# Patient Record
Sex: FEMALE | Race: BLACK OR AFRICAN AMERICAN | URBAN - METROPOLITAN AREA
[De-identification: names, ages, dates, MRNs, and addresses within clinical notes are randomized per-mention and may not be internally consistent; named-entity substitution may affect disease eponyms.]

---

## 2021-05-12 ENCOUNTER — OFFICE VISIT (OUTPATIENT)
Dept: INTERNAL MEDICINE CLINIC | Age: 26
End: 2021-05-12
Payer: COMMERCIAL

## 2021-05-12 VITALS
SYSTOLIC BLOOD PRESSURE: 124 MMHG | DIASTOLIC BLOOD PRESSURE: 80 MMHG | TEMPERATURE: 97.5 F | RESPIRATION RATE: 16 BRPM | HEART RATE: 85 BPM | WEIGHT: 145 LBS | OXYGEN SATURATION: 98 % | BODY MASS INDEX: 25.69 KG/M2 | HEIGHT: 63 IN

## 2021-05-12 DIAGNOSIS — Z76.89 ENCOUNTER TO ESTABLISH CARE: ICD-10-CM

## 2021-05-12 DIAGNOSIS — J02.9 SORE THROAT: Primary | ICD-10-CM

## 2021-05-12 DIAGNOSIS — J45.20 MILD INTERMITTENT ASTHMA WITHOUT COMPLICATION: ICD-10-CM

## 2021-05-12 LAB
S PYO AG THROAT QL: NEGATIVE
VALID INTERNAL CONTROL?: YES

## 2021-05-12 PROCEDURE — 99203 OFFICE O/P NEW LOW 30 MIN: CPT | Performed by: NURSE PRACTITIONER

## 2021-05-12 PROCEDURE — 87880 STREP A ASSAY W/OPTIC: CPT | Performed by: NURSE PRACTITIONER

## 2021-05-12 RX ORDER — ALBUTEROL SULFATE 90 UG/1
1-2 AEROSOL, METERED RESPIRATORY (INHALATION)
Qty: 1 INHALER | Refills: 1 | Status: SHIPPED | OUTPATIENT
Start: 2021-05-12

## 2021-05-12 RX ORDER — LORATADINE 10 MG/1
10 TABLET ORAL
Qty: 30 TAB | Refills: 3 | Status: SHIPPED | OUTPATIENT
Start: 2021-05-12

## 2021-05-12 RX ORDER — MONTELUKAST SODIUM 10 MG/1
10 TABLET ORAL DAILY
Qty: 30 TAB | Refills: 0 | Status: CANCELLED | OUTPATIENT
Start: 2021-05-12

## 2021-05-12 NOTE — PROGRESS NOTES
HISTORY OF PRESENT ILLNESS  Jolene Richards is a 26 y.o. female. Here to establish care with complaints of sore throat. HPI  She reports her throat is hurting on and off - will go away for week and it will return, mostly on the left side of her throat - tea does help some. Denies problem swallowing, coughing, runny nose or watery eyes. She does not have a humidifier. use to use an asthma pump- Hx of asthma. She does have a GYN Dr she is up to date on PAP's. /80 (BP 1 Location: Right arm, BP Patient Position: Sitting)   Pulse 85   Temp 97.5 °F (36.4 °C) (Temporal)   Resp 16   Ht 5' 3\" (1.6 m)   Wt 145 lb (65.8 kg)   LMP 05/04/2021 (Approximate)   SpO2 98%   BMI 25.69 kg/m²     Review of Systems   Constitutional: Negative for chills, fever and malaise/fatigue. Eyes: Negative for blurred vision and double vision. Respiratory: Negative for cough, shortness of breath and wheezing. Cardiovascular: Negative for chest pain, palpitations and leg swelling. Neurological: Positive for headaches (with computer use ). Negative for dizziness. Physical Exam  Vitals signs and nursing note reviewed. Constitutional:       General: She is not in acute distress. Appearance: Normal appearance. She is not ill-appearing. HENT:      Head: Normocephalic. Right Ear: External ear normal.      Left Ear: External ear normal.      Nose: Nose normal.      Mouth/Throat:      Mouth: Mucous membranes are moist.      Pharynx: Oropharynx is clear. Posterior oropharyngeal erythema present. Comments: MASK  Eyes:      General: No scleral icterus. Extraocular Movements: Extraocular movements intact. Conjunctiva/sclera: Conjunctivae normal.      Pupils: Pupils are equal, round, and reactive to light. Neck:      Musculoskeletal: Normal range of motion. Cardiovascular:      Rate and Rhythm: Normal rate and regular rhythm. Pulses: Normal pulses. Heart sounds: Normal heart sounds. Pulmonary:      Effort: Pulmonary effort is normal. No respiratory distress. Breath sounds: Normal breath sounds. No wheezing. Abdominal:      Tenderness: There is no right CVA tenderness or left CVA tenderness. Musculoskeletal: Normal range of motion. Right lower leg: No edema. Left lower leg: No edema. Lymphadenopathy:      Cervical: No cervical adenopathy. Skin:     General: Skin is warm and dry. Findings: No lesion or rash. Neurological:      General: No focal deficit present. Mental Status: She is alert and oriented to person, place, and time. Psychiatric:         Mood and Affect: Mood normal.         Behavior: Behavior normal.         Thought Content: Thought content normal.         Judgment: Judgment normal.       ASSESSMENT and PLAN  Diagnoses and all orders for this visit:    1. Sore throat  -     AMB POC RAPID STREP A  -     loratadine (Claritin) 10 mg tablet; Take 1 Tab by mouth daily as needed for Allergies. 2. Mild intermittent asthma without complication  -     albuterol (PROVENTIL HFA, VENTOLIN HFA, PROAIR HFA) 90 mcg/actuation inhaler; Take 1-2 Puffs by inhalation every six (6) hours as needed for Wheezing or Shortness of Breath. 3. Encounter to establish care  -     CBC WITH AUTOMATED DIFF; Future  -     METABOLIC PANEL, COMPREHENSIVE; Future  -     TSH AND FREE T4; Future  -     URINALYSIS W/MICROSCOPIC; Future      Follow-up and Dispositions    · Return if symptoms worsen or fail to improve.

## 2021-05-12 NOTE — PROGRESS NOTES
ROOM # 1  Identified pt with two pt identifiers(name and ). Reviewed record in preparation for visit and have obtained necessary documentation. Chief Complaint   Patient presents with    Establish Care    Sore Throat     x 3 months       Wing Ennis preferred language for health care discussion is english/other. Is the patient using any DME equipment during OV? NO    Wing Ennis is due for:  Health Maintenance Due   Topic    HPV Age 9Y-34Y (1 - 2-dose series)    DTaP/Tdap/Td series (1 - Tdap)    PAP AKA CERVICAL CYTOLOGY      Health Maintenance reviewed and discussed per provider  Please order/place referral if appropriate. Advance Directive:  1. Do you have an advance directive in place? Patient Reply: NO    2. If not, would you like material regarding how to put one in place? NO    Coordination of Care:  1. Have you been to the ER, urgent care clinic since your last visit? Hospitalized since your last visit? NO    2. Have you seen or consulted any other health care providers outside of the [address] [place] [address] since your last visit? Include any pap smears or colon screening. NO    Patient is accompanied by self I have received verbal consent from Wing Ennis to discuss any/all medical information while they are present in the room. Depression Screening:  3 most recent PHQ Screens 2021   Little interest or pleasure in doing things Not at all   Feeling down, depressed, irritable, or hopeless Not at all   Total Score PHQ 2 0     Recent Travel Screening and Travel History documentation     Travel Screening     Question   Response    In the last month, have you been in contact with someone who was confirmed or suspected to have Coronavirus / COVID-19? No / Unsure    Have you had a COVID-19 viral test in the last 14 days? No    Do you have any of the following new or worsening symptoms? None of these    Have you traveled internationally or domestically in the last month?   No Travel History   Travel since 04/12/21     No documented travel since 04/12/21

## 2021-05-13 LAB
ALBUMIN SERPL-MCNC: 4.6 G/DL (ref 3.9–5)
ALBUMIN/GLOB SERPL: 1.5 {RATIO} (ref 1.2–2.2)
ALP SERPL-CCNC: 78 IU/L (ref 39–117)
ALT SERPL-CCNC: 10 IU/L (ref 0–32)
APPEARANCE UR: CLEAR
AST SERPL-CCNC: 12 IU/L (ref 0–40)
BACTERIA #/AREA URNS HPF: NORMAL /[HPF]
BASOPHILS # BLD AUTO: 0.1 X10E3/UL (ref 0–0.2)
BASOPHILS NFR BLD AUTO: 1 %
BILIRUB SERPL-MCNC: 0.4 MG/DL (ref 0–1.2)
BILIRUB UR QL STRIP: NEGATIVE
BUN SERPL-MCNC: 14 MG/DL (ref 6–20)
BUN/CREAT SERPL: 20 (ref 9–23)
CALCIUM SERPL-MCNC: 9.7 MG/DL (ref 8.7–10.2)
CASTS URNS QL MICRO: NORMAL /LPF
CHLORIDE SERPL-SCNC: 102 MMOL/L (ref 96–106)
CO2 SERPL-SCNC: 25 MMOL/L (ref 20–29)
COLOR UR: YELLOW
CREAT SERPL-MCNC: 0.69 MG/DL (ref 0.57–1)
EOSINOPHIL # BLD AUTO: 0.2 X10E3/UL (ref 0–0.4)
EOSINOPHIL NFR BLD AUTO: 3 %
EPI CELLS #/AREA URNS HPF: NORMAL /HPF (ref 0–10)
ERYTHROCYTE [DISTWIDTH] IN BLOOD BY AUTOMATED COUNT: 11.9 % (ref 11.7–15.4)
GLOBULIN SER CALC-MCNC: 3.1 G/DL (ref 1.5–4.5)
GLUCOSE SERPL-MCNC: 81 MG/DL (ref 65–99)
GLUCOSE UR QL: NEGATIVE
HCT VFR BLD AUTO: 39.2 % (ref 34–46.6)
HGB BLD-MCNC: 12.6 G/DL (ref 11.1–15.9)
HGB UR QL STRIP: NEGATIVE
IMM GRANULOCYTES # BLD AUTO: 0 X10E3/UL (ref 0–0.1)
IMM GRANULOCYTES NFR BLD AUTO: 0 %
KETONES UR QL STRIP: NEGATIVE
LEUKOCYTE ESTERASE UR QL STRIP: NEGATIVE
LYMPHOCYTES # BLD AUTO: 2.1 X10E3/UL (ref 0.7–3.1)
LYMPHOCYTES NFR BLD AUTO: 35 %
MCH RBC QN AUTO: 29 PG (ref 26.6–33)
MCHC RBC AUTO-ENTMCNC: 32.1 G/DL (ref 31.5–35.7)
MCV RBC AUTO: 90 FL (ref 79–97)
MICRO URNS: ABNORMAL
MICRO URNS: ABNORMAL
MONOCYTES # BLD AUTO: 0.4 X10E3/UL (ref 0.1–0.9)
MONOCYTES NFR BLD AUTO: 7 %
NEUTROPHILS # BLD AUTO: 3.1 X10E3/UL (ref 1.4–7)
NEUTROPHILS NFR BLD AUTO: 54 %
NITRITE UR QL STRIP: NEGATIVE
PH UR STRIP: 5.5 [PH] (ref 5–7.5)
PLATELET # BLD AUTO: 254 X10E3/UL (ref 150–450)
POTASSIUM SERPL-SCNC: 4.2 MMOL/L (ref 3.5–5.2)
PROT SERPL-MCNC: 7.7 G/DL (ref 6–8.5)
PROT UR QL STRIP: NEGATIVE
RBC # BLD AUTO: 4.35 X10E6/UL (ref 3.77–5.28)
RBC #/AREA URNS HPF: NORMAL /HPF (ref 0–2)
SODIUM SERPL-SCNC: 139 MMOL/L (ref 134–144)
SP GR UR: >=1.03 (ref 1–1.03)
T4 FREE SERPL-MCNC: 0.99 NG/DL (ref 0.82–1.77)
TSH SERPL DL<=0.005 MIU/L-ACNC: 1.35 UIU/ML (ref 0.45–4.5)
UROBILINOGEN UR STRIP-MCNC: 0.2 MG/DL (ref 0.2–1)
WBC # BLD AUTO: 5.8 X10E3/UL (ref 3.4–10.8)
WBC #/AREA URNS HPF: NORMAL /HPF (ref 0–5)

## 2024-01-03 SDOH — HEALTH STABILITY: PHYSICAL HEALTH: ON AVERAGE, HOW MANY MINUTES DO YOU ENGAGE IN EXERCISE AT THIS LEVEL?: 50 MIN

## 2024-01-03 SDOH — HEALTH STABILITY: PHYSICAL HEALTH: ON AVERAGE, HOW MANY DAYS PER WEEK DO YOU ENGAGE IN MODERATE TO STRENUOUS EXERCISE (LIKE A BRISK WALK)?: 3 DAYS

## 2024-01-04 ENCOUNTER — HOSPITAL ENCOUNTER (OUTPATIENT)
Facility: HOSPITAL | Age: 29
Setting detail: SPECIMEN
Discharge: HOME | End: 2024-01-04
Payer: COMMERCIAL

## 2024-01-04 ENCOUNTER — OFFICE VISIT (OUTPATIENT)
Facility: CLINIC | Age: 29
End: 2024-01-04
Payer: COMMERCIAL

## 2024-01-04 VITALS
WEIGHT: 164 LBS | OXYGEN SATURATION: 95 % | RESPIRATION RATE: 17 BRPM | TEMPERATURE: 97.9 F | SYSTOLIC BLOOD PRESSURE: 105 MMHG | HEART RATE: 85 BPM | HEIGHT: 63 IN | BODY MASS INDEX: 29.06 KG/M2 | DIASTOLIC BLOOD PRESSURE: 73 MMHG

## 2024-01-04 DIAGNOSIS — J45.20 MILD INTERMITTENT ASTHMA WITHOUT COMPLICATION: ICD-10-CM

## 2024-01-04 DIAGNOSIS — Z13.220 SCREENING CHOLESTEROL LEVEL: ICD-10-CM

## 2024-01-04 DIAGNOSIS — Z76.89 ENCOUNTER TO ESTABLISH CARE: ICD-10-CM

## 2024-01-04 DIAGNOSIS — Z76.89 ENCOUNTER TO ESTABLISH CARE: Primary | ICD-10-CM

## 2024-01-04 DIAGNOSIS — Z11.59 ENCOUNTER FOR HEPATITIS C SCREENING TEST FOR LOW RISK PATIENT: ICD-10-CM

## 2024-01-04 DIAGNOSIS — Z83.49 FAMILY HISTORY OF THYROID DISEASE: ICD-10-CM

## 2024-01-04 DIAGNOSIS — Z11.3 ROUTINE SCREENING FOR STI (SEXUALLY TRANSMITTED INFECTION): ICD-10-CM

## 2024-01-04 DIAGNOSIS — Z13.1 DIABETES MELLITUS SCREENING: ICD-10-CM

## 2024-01-04 LAB
ALBUMIN SERPL-MCNC: 4.1 G/DL (ref 3.4–5)
ALBUMIN/GLOB SERPL: 1.2 (ref 0.8–1.7)
ALP SERPL-CCNC: 81 U/L (ref 45–117)
ALT SERPL-CCNC: 20 U/L (ref 13–56)
ANION GAP SERPL CALC-SCNC: 5 MMOL/L (ref 3–18)
AST SERPL-CCNC: 31 U/L (ref 10–38)
BASOPHILS # BLD: 0.1 K/UL (ref 0–0.1)
BASOPHILS NFR BLD: 1 % (ref 0–2)
BILIRUB SERPL-MCNC: 0.5 MG/DL (ref 0.2–1)
BUN SERPL-MCNC: 12 MG/DL (ref 7–18)
BUN/CREAT SERPL: 17 (ref 12–20)
CALCIUM SERPL-MCNC: 9.1 MG/DL (ref 8.5–10.1)
CHLORIDE SERPL-SCNC: 105 MMOL/L (ref 100–111)
CHOLEST SERPL-MCNC: 185 MG/DL
CO2 SERPL-SCNC: 29 MMOL/L (ref 21–32)
CREAT SERPL-MCNC: 0.71 MG/DL (ref 0.6–1.3)
DIFFERENTIAL METHOD BLD: NORMAL
EOSINOPHIL # BLD: 0.1 K/UL (ref 0–0.4)
EOSINOPHIL NFR BLD: 2 % (ref 0–5)
ERYTHROCYTE [DISTWIDTH] IN BLOOD BY AUTOMATED COUNT: 12.6 % (ref 11.6–14.5)
GLOBULIN SER CALC-MCNC: 3.5 G/DL (ref 2–4)
GLUCOSE SERPL-MCNC: 83 MG/DL (ref 74–99)
HBA1C MFR BLD: 4.6 % (ref 4.2–5.6)
HCT VFR BLD AUTO: 41.1 % (ref 35–45)
HDLC SERPL-MCNC: 54 MG/DL (ref 40–60)
HDLC SERPL: 3.4 (ref 0–5)
HGB BLD-MCNC: 12.9 G/DL (ref 12–16)
IMM GRANULOCYTES # BLD AUTO: 0 K/UL (ref 0–0.04)
IMM GRANULOCYTES NFR BLD AUTO: 0 % (ref 0–0.5)
LDLC SERPL CALC-MCNC: 122 MG/DL (ref 0–100)
LIPID PANEL: ABNORMAL
LYMPHOCYTES # BLD: 1.8 K/UL (ref 0.9–3.6)
LYMPHOCYTES NFR BLD: 31 % (ref 21–52)
MCH RBC QN AUTO: 29.9 PG (ref 24–34)
MCHC RBC AUTO-ENTMCNC: 31.4 G/DL (ref 31–37)
MCV RBC AUTO: 95.4 FL (ref 78–100)
MONOCYTES # BLD: 0.4 K/UL (ref 0.05–1.2)
MONOCYTES NFR BLD: 6 % (ref 3–10)
NEUTS SEG # BLD: 3.4 K/UL (ref 1.8–8)
NEUTS SEG NFR BLD: 59 % (ref 40–73)
NRBC # BLD: 0 K/UL (ref 0–0.01)
NRBC BLD-RTO: 0 PER 100 WBC
PLATELET # BLD AUTO: 217 K/UL (ref 135–420)
PMV BLD AUTO: 11 FL (ref 9.2–11.8)
POTASSIUM SERPL-SCNC: 3.9 MMOL/L (ref 3.5–5.5)
PROT SERPL-MCNC: 7.6 G/DL (ref 6.4–8.2)
RBC # BLD AUTO: 4.31 M/UL (ref 4.2–5.3)
SODIUM SERPL-SCNC: 139 MMOL/L (ref 136–145)
TRIGL SERPL-MCNC: 45 MG/DL
TSH SERPL DL<=0.05 MIU/L-ACNC: 1.72 UIU/ML (ref 0.36–3.74)
VLDLC SERPL CALC-MCNC: 9 MG/DL
WBC # BLD AUTO: 5.7 K/UL (ref 4.6–13.2)

## 2024-01-04 PROCEDURE — 86803 HEPATITIS C AB TEST: CPT

## 2024-01-04 PROCEDURE — 80061 LIPID PANEL: CPT

## 2024-01-04 PROCEDURE — 87491 CHLMYD TRACH DNA AMP PROBE: CPT

## 2024-01-04 PROCEDURE — 87591 N.GONORRHOEAE DNA AMP PROB: CPT

## 2024-01-04 PROCEDURE — 85025 COMPLETE CBC W/AUTO DIFF WBC: CPT

## 2024-01-04 PROCEDURE — G8419 CALC BMI OUT NRM PARAM NOF/U: HCPCS | Performed by: STUDENT IN AN ORGANIZED HEALTH CARE EDUCATION/TRAINING PROGRAM

## 2024-01-04 PROCEDURE — 99204 OFFICE O/P NEW MOD 45 MIN: CPT | Performed by: STUDENT IN AN ORGANIZED HEALTH CARE EDUCATION/TRAINING PROGRAM

## 2024-01-04 PROCEDURE — 80053 COMPREHEN METABOLIC PANEL: CPT

## 2024-01-04 PROCEDURE — 36415 COLL VENOUS BLD VENIPUNCTURE: CPT

## 2024-01-04 PROCEDURE — 83036 HEMOGLOBIN GLYCOSYLATED A1C: CPT

## 2024-01-04 PROCEDURE — 4004F PT TOBACCO SCREEN RCVD TLK: CPT | Performed by: STUDENT IN AN ORGANIZED HEALTH CARE EDUCATION/TRAINING PROGRAM

## 2024-01-04 PROCEDURE — 87389 HIV-1 AG W/HIV-1&-2 AB AG IA: CPT

## 2024-01-04 PROCEDURE — 86592 SYPHILIS TEST NON-TREP QUAL: CPT

## 2024-01-04 PROCEDURE — 87661 TRICHOMONAS VAGINALIS AMPLIF: CPT

## 2024-01-04 PROCEDURE — G8484 FLU IMMUNIZE NO ADMIN: HCPCS | Performed by: STUDENT IN AN ORGANIZED HEALTH CARE EDUCATION/TRAINING PROGRAM

## 2024-01-04 PROCEDURE — G8427 DOCREV CUR MEDS BY ELIG CLIN: HCPCS | Performed by: STUDENT IN AN ORGANIZED HEALTH CARE EDUCATION/TRAINING PROGRAM

## 2024-01-04 PROCEDURE — 84443 ASSAY THYROID STIM HORMONE: CPT

## 2024-01-04 RX ORDER — ALBUTEROL SULFATE 90 UG/1
1-2 AEROSOL, METERED RESPIRATORY (INHALATION) EVERY 6 HOURS PRN
Qty: 18 G | Refills: 3 | Status: SHIPPED | OUTPATIENT
Start: 2024-01-04

## 2024-01-04 SDOH — ECONOMIC STABILITY: HOUSING INSECURITY
IN THE LAST 12 MONTHS, WAS THERE A TIME WHEN YOU DID NOT HAVE A STEADY PLACE TO SLEEP OR SLEPT IN A SHELTER (INCLUDING NOW)?: NO

## 2024-01-04 SDOH — ECONOMIC STABILITY: FOOD INSECURITY: WITHIN THE PAST 12 MONTHS, THE FOOD YOU BOUGHT JUST DIDN'T LAST AND YOU DIDN'T HAVE MONEY TO GET MORE.: NEVER TRUE

## 2024-01-04 SDOH — ECONOMIC STABILITY: INCOME INSECURITY: HOW HARD IS IT FOR YOU TO PAY FOR THE VERY BASICS LIKE FOOD, HOUSING, MEDICAL CARE, AND HEATING?: NOT HARD AT ALL

## 2024-01-04 SDOH — ECONOMIC STABILITY: FOOD INSECURITY: WITHIN THE PAST 12 MONTHS, YOU WORRIED THAT YOUR FOOD WOULD RUN OUT BEFORE YOU GOT MONEY TO BUY MORE.: NEVER TRUE

## 2024-01-04 ASSESSMENT — ENCOUNTER SYMPTOMS
SHORTNESS OF BREATH: 0
ABDOMINAL PAIN: 0

## 2024-01-04 ASSESSMENT — PATIENT HEALTH QUESTIONNAIRE - PHQ9
SUM OF ALL RESPONSES TO PHQ QUESTIONS 1-9: 0
1. LITTLE INTEREST OR PLEASURE IN DOING THINGS: 0
2. FEELING DOWN, DEPRESSED OR HOPELESS: 0
SUM OF ALL RESPONSES TO PHQ QUESTIONS 1-9: 0
SUM OF ALL RESPONSES TO PHQ9 QUESTIONS 1 & 2: 0

## 2024-01-04 NOTE — PROGRESS NOTES
Renu Nolan is a 28 y.o. presents today for   Chief Complaint   Patient presents with    Establish Care     Pt here to establish care with provider        Is someone accompanying this pt? no    Is the patient using any DME equipment during OV? no    Depression Screenin/4/2024     9:12 AM 2021     2:25 PM   PHQ-9 Questionaire   Little interest or pleasure in doing things 0 0   Feeling down, depressed, or hopeless 0 0   PHQ-9 Total Score 0 0       Abuse Screening:       No data to display                Learning Assessment:  No question data found.    Fall Risk:       No data to display                    Coordination of Care:   1. \"Have you been to the ER, urgent care clinic since your last visit?  Hospitalized since your last visit?\" no    2. \"Have you seen or consulted any other health care providers outside of the [facility] since your last visit?\" no    3. For patients aged 45-75: Has the patient had a colonoscopy / FIT/ Cologuard? no    If the patient is female:    4. For patients aged 40-74: Has the patient had a mammogram within the past 2 years? no    5. For patients aged 21-65: Has the patient had a pap smear? Yes     Health Maintenance: reviewed and discussed and ordered per Provider.    Health Maintenance Due   Topic Date Due    Hepatitis B vaccine (1 of 3 - 3-dose series) Never done    Varicella vaccine (1 of 2 - 2-dose childhood series) Never done    Depression Screen  Never done    HIV screen  Never done    Hepatitis C screen  Never done    DTaP/Tdap/Td vaccine (1 - Tdap) Never done    Pap smear  Never done    Flu vaccine (1) Never done    COVID-19 Vaccine (3 - 2023-24 season) 2023       
after-visit summary and questions were answered concerning future plans.  I have discussed medication side effects and warnings with the patient as well. I have reviewed the plan of care with the patient, accepted their input and they are in agreement with the treatment goals.     Follow-up and Dispositions    Return in about 6 months (around 7/4/2024).       Marie Mason MD  January 4, 2024  
wheezing or crackles  Heart: normal rate, regular rhythm, no murmur  Abdomen: soft, non-distended, non-tender, normal bowel sounds, no organomegaly, no masses  Extremities: no focal deformities, no edema  Skin: no active skin lesions      Laboratory/Tests:      Assessment/Plan:    There are no diagnoses linked to this encounter.  Mild, Intermittent Asthma  - albuterol sulfate  (90 Base) MCG/ACT inhaler: inhale 1-2 puffs into the lungs every 6 hours as needed for Wheezing - Inhalation    2. Health Maintenance   - CMP, CBC, lipid panel, Hep C, HIV, PRP, Chlamydia, Gonorrhea tests ordered  - Tdap immunization completed today    3. Family history of thyroid disease   - TSH ordered    4. Family history of diabetes mellitus    - HbA1c ordered        I have discussed the diagnosis with the patient and the intended plan as seen in the above orders.  The patient has received an after-visit summary and questions were answered concerning future plans.  I have discussed medication side effects and warnings with the patient as well. I have reviewed the plan of care with the patient, accepted their input and they are in agreement with the treatment goals.       Shanae Leobardo  January 4, 2024     *ATTENTION:  This note has been created by a medical student for educational purposes only.  Please do not refer to the content of this note for clinical decision-making, billing, or other purposes.  Please see attending physician’s note to obtain clinical information on this patient.*

## 2024-01-05 LAB
C TRACH RRNA SPEC QL NAA+PROBE: NEGATIVE
HCV AB SER IA-ACNC: 0.02 INDEX
HCV AB SERPL QL IA: NEGATIVE
HEPATITIS C COMMENT: NORMAL
HIV 1+2 AB+HIV1 P24 AG SERPL QL IA: NONREACTIVE
HIV 1/2 RESULT COMMENT: NORMAL
N GONORRHOEA RRNA SPEC QL NAA+PROBE: NEGATIVE
RPR SER QL: NONREACTIVE
SPECIMEN SOURCE: NORMAL
T VAGINALIS RRNA SPEC QL NAA+PROBE: NEGATIVE

## 2024-07-05 ENCOUNTER — HOSPITAL ENCOUNTER (OUTPATIENT)
Facility: HOSPITAL | Age: 29
Setting detail: SPECIMEN
End: 2024-07-05
Payer: COMMERCIAL

## 2024-07-05 ENCOUNTER — OFFICE VISIT (OUTPATIENT)
Facility: CLINIC | Age: 29
End: 2024-07-05
Payer: COMMERCIAL

## 2024-07-05 VITALS
HEART RATE: 79 BPM | WEIGHT: 165.6 LBS | HEIGHT: 63 IN | OXYGEN SATURATION: 98 % | RESPIRATION RATE: 18 BRPM | BODY MASS INDEX: 29.34 KG/M2 | TEMPERATURE: 98.2 F | DIASTOLIC BLOOD PRESSURE: 70 MMHG | SYSTOLIC BLOOD PRESSURE: 106 MMHG

## 2024-07-05 DIAGNOSIS — Z01.84 IMMUNITY STATUS TESTING: ICD-10-CM

## 2024-07-05 DIAGNOSIS — J45.20 MILD INTERMITTENT ASTHMA WITHOUT COMPLICATION: Primary | ICD-10-CM

## 2024-07-05 DIAGNOSIS — Z23 NEED FOR VACCINATION: ICD-10-CM

## 2024-07-05 PROCEDURE — 36415 COLL VENOUS BLD VENIPUNCTURE: CPT

## 2024-07-05 PROCEDURE — 86735 MUMPS ANTIBODY: CPT

## 2024-07-05 PROCEDURE — 1036F TOBACCO NON-USER: CPT | Performed by: STUDENT IN AN ORGANIZED HEALTH CARE EDUCATION/TRAINING PROGRAM

## 2024-07-05 PROCEDURE — G8419 CALC BMI OUT NRM PARAM NOF/U: HCPCS | Performed by: STUDENT IN AN ORGANIZED HEALTH CARE EDUCATION/TRAINING PROGRAM

## 2024-07-05 PROCEDURE — 86765 RUBEOLA ANTIBODY: CPT

## 2024-07-05 PROCEDURE — G8427 DOCREV CUR MEDS BY ELIG CLIN: HCPCS | Performed by: STUDENT IN AN ORGANIZED HEALTH CARE EDUCATION/TRAINING PROGRAM

## 2024-07-05 PROCEDURE — 86762 RUBELLA ANTIBODY: CPT

## 2024-07-05 PROCEDURE — 86787 VARICELLA-ZOSTER ANTIBODY: CPT

## 2024-07-05 PROCEDURE — 99213 OFFICE O/P EST LOW 20 MIN: CPT | Performed by: STUDENT IN AN ORGANIZED HEALTH CARE EDUCATION/TRAINING PROGRAM

## 2024-07-05 SDOH — ECONOMIC STABILITY: FOOD INSECURITY: WITHIN THE PAST 12 MONTHS, THE FOOD YOU BOUGHT JUST DIDN'T LAST AND YOU DIDN'T HAVE MONEY TO GET MORE.: NEVER TRUE

## 2024-07-05 SDOH — ECONOMIC STABILITY: FOOD INSECURITY: WITHIN THE PAST 12 MONTHS, YOU WORRIED THAT YOUR FOOD WOULD RUN OUT BEFORE YOU GOT MONEY TO BUY MORE.: NEVER TRUE

## 2024-07-05 SDOH — ECONOMIC STABILITY: INCOME INSECURITY: HOW HARD IS IT FOR YOU TO PAY FOR THE VERY BASICS LIKE FOOD, HOUSING, MEDICAL CARE, AND HEATING?: NOT HARD AT ALL

## 2024-07-05 ASSESSMENT — ENCOUNTER SYMPTOMS
ABDOMINAL PAIN: 0
SHORTNESS OF BREATH: 0

## 2024-07-05 ASSESSMENT — PATIENT HEALTH QUESTIONNAIRE - PHQ9
SUM OF ALL RESPONSES TO PHQ QUESTIONS 1-9: 0
SUM OF ALL RESPONSES TO PHQ QUESTIONS 1-9: 0
2. FEELING DOWN, DEPRESSED OR HOPELESS: NOT AT ALL
SUM OF ALL RESPONSES TO PHQ QUESTIONS 1-9: 0
1. LITTLE INTEREST OR PLEASURE IN DOING THINGS: NOT AT ALL
SUM OF ALL RESPONSES TO PHQ QUESTIONS 1-9: 0
SUM OF ALL RESPONSES TO PHQ9 QUESTIONS 1 & 2: 0

## 2024-07-05 ASSESSMENT — ANXIETY QUESTIONNAIRES
IF YOU CHECKED OFF ANY PROBLEMS ON THIS QUESTIONNAIRE, HOW DIFFICULT HAVE THESE PROBLEMS MADE IT FOR YOU TO DO YOUR WORK, TAKE CARE OF THINGS AT HOME, OR GET ALONG WITH OTHER PEOPLE: NOT DIFFICULT AT ALL
1. FEELING NERVOUS, ANXIOUS, OR ON EDGE: NOT AT ALL
3. WORRYING TOO MUCH ABOUT DIFFERENT THINGS: NOT AT ALL
2. NOT BEING ABLE TO STOP OR CONTROL WORRYING: NOT AT ALL
GAD7 TOTAL SCORE: 0
6. BECOMING EASILY ANNOYED OR IRRITABLE: NOT AT ALL
7. FEELING AFRAID AS IF SOMETHING AWFUL MIGHT HAPPEN: NOT AT ALL
4. TROUBLE RELAXING: NOT AT ALL
5. BEING SO RESTLESS THAT IT IS HARD TO SIT STILL: NOT AT ALL

## 2024-07-06 LAB
MEV IGG SER IA-ACNC: >300 AU/ML
MUV IGG SER IA-ACNC: 245 AU/ML
RUBV IGG SER-IMP: REACTIVE
VZV IGG SER IA-ACNC: 387 INDEX

## 2024-09-06 NOTE — PROGRESS NOTES
Hemodynamic equipment used: 5 lead ECG, Calometric ETCO2 device, Machine BP Cuff and pulse oximeter probe.
Renu Nolan is a 28 y.o. presents today for   Chief Complaint   Patient presents with    Follow-up     Is someone accompanying this pt? no    Is the patient using any DME equipment during OV? no  There were no vitals filed for this visit.    Depression Screenin/5/2024     8:03 AM 2024     9:12 AM 2021     2:25 PM   PHQ-9 Questionaire   Little interest or pleasure in doing things 0 0 0   Feeling down, depressed, or hopeless 0 0 0   PHQ-9 Total Score 0 0 0        Abuse Screening:       No data to display                 Learning Assessment Screening:   No question data found.     Fall Risk Screening:        No data to display                    Health Maintenance: reviewed and discussed and ordered per Provider.    Health Maintenance Due   Topic Date Due    Varicella vaccine (2 of 2 - 2-dose childhood series) 08/15/2000    DTaP/Tdap/Td vaccine (2 - Td or Tdap) 2017    Pap smear  Never done    COVID-19 Vaccine ( season) 2023         Coordination of Care:   1. \"Have you been to the ER, urgent care clinic since your last visit?  Hospitalized since your last visit?\" no    2. \"Have you seen or consulted any other health care providers outside of the [facility] since your last visit?\" no    3. For patients aged 45-75: Has the patient had a colonoscopy / FIT/ Cologuard?NA - based on age or sex  If the patient is female:    4. For patients aged 40-74: Has the patient had a mammogram within the past 2 years? NA - based on age or sex    5. For patients aged 21-65: Has the patient had a pap smear? Yes - no Care Gap present \scheduled    Advanced Directive:  1. Do you have an Advanced Directive? No     2. Would you like information on Advanced Directives? No  
Connections: Not on file   Intimate Partner Violence: Not on file   Housing Stability: Unknown (7/5/2024)    Housing Stability Vital Sign     Unable to Pay for Housing in the Last Year: Not on file     Number of Places Lived in the Last Year: Not on file     Unstable Housing in the Last Year: No      Current Outpatient Medications   Medication Sig Dispense Refill    albuterol sulfate HFA (PROVENTIL;VENTOLIN;PROAIR) 108 (90 Base) MCG/ACT inhaler Inhale 1-2 puffs into the lungs every 6 hours as needed for Wheezing 18 g 3     No current facility-administered medications for this visit.        ROS   Review of Systems   Constitutional:  Negative for chills and fever.   Respiratory:  Negative for shortness of breath.    Cardiovascular:  Negative for chest pain.   Gastrointestinal:  Negative for abdominal pain.   Neurological:  Negative for dizziness, light-headedness and headaches.   Psychiatric/Behavioral:  Negative for dysphoric mood.          Physical Exam  Vitals:    07/05/24 0808   BP: 106/70   Site: Right Upper Arm   Position: Sitting   Cuff Size: Medium Adult   Pulse: 79   Resp: 18   Temp: 98.2 °F (36.8 °C)   TempSrc: Temporal   SpO2: 98%   Weight: 75.1 kg (165 lb 9.6 oz)   Height: 1.6 m (5' 3\")     General: alert, oriented, not in distress  Chest/Lungs: breathing comfortably on room air  Heart: normal rate  Extremities: no focal deformities, no edema  Skin: no active skin lesions    Assessment/Plan:    Renu was seen today for follow-up.    Diagnoses and all orders for this visit:    Mild intermittent asthma without complication  -intermittent albuterol use  -continue current management    Immunity status testing  -     Varicella Zoster Antibody, IgG; Future  -     Rubella antibody, IgG; Future  -     Rubeola Antibody, IgG; Future  -     Mumps Antibody, IgG; Future    Need for vaccination  -Tdap not available in office today  -pt to schedule nurse visit when available     On this date 7/5/2024 I have spent 20

## 2024-11-15 ENCOUNTER — TELEMEDICINE (OUTPATIENT)
Facility: CLINIC | Age: 29
End: 2024-11-15
Payer: COMMERCIAL

## 2024-11-15 DIAGNOSIS — F41.8 DEPRESSION WITH ANXIETY: Primary | ICD-10-CM

## 2024-11-15 PROCEDURE — 99214 OFFICE O/P EST MOD 30 MIN: CPT | Performed by: STUDENT IN AN ORGANIZED HEALTH CARE EDUCATION/TRAINING PROGRAM

## 2024-11-15 RX ORDER — SERTRALINE HYDROCHLORIDE 25 MG/1
25 TABLET, FILM COATED ORAL DAILY
Qty: 30 TABLET | Refills: 5 | Status: SHIPPED | OUTPATIENT
Start: 2024-11-15

## 2024-11-15 RX ORDER — HYDROXYZINE HYDROCHLORIDE 25 MG/1
25 TABLET, FILM COATED ORAL EVERY 8 HOURS PRN
Qty: 90 TABLET | Refills: 5 | Status: SHIPPED | OUTPATIENT
Start: 2024-11-15

## 2024-11-15 ASSESSMENT — ENCOUNTER SYMPTOMS: SHORTNESS OF BREATH: 0

## 2024-11-15 NOTE — PROGRESS NOTES
History of Present Illness  Renu Nolan is a 29 y.o. female who presents today for management of    Chief Complaint   Patient presents with    Follow-up         - Patient reporting worsening mood over last year  - Low moods started in 2022  -Symptoms include difficulty getting out of bed, low self-worth, poor motivation, increased anxiety, decreased sleep and insomnia.  -Patient started seeing a therapist which has been helpful however feels like her mood has worsened over the last several months despite this.  -Very supportive , however otherwise poor family support.  -Currently working from home, planning to switch careers to something more fulfilling, planning to be an    -Patient currently struggling with increased anxiety and fear of letting people down  -Fearful of worsening depression related to her mood last year.  -Also interested in holistic supplements to help with mood.  Previously trialed a brand that was working, however is currently discontinued.        There is no problem list on file for this patient.     Past Medical History:   Diagnosis Date    Asthma       No past surgical history on file.   Family History   Problem Relation Age of Onset    No Known Problems Brother     No Known Problems Father     Unknown Mother      Social History     Socioeconomic History    Marital status: Single     Spouse name: Not on file    Number of children: Not on file    Years of education: Not on file    Highest education level: Not on file   Occupational History    Not on file   Tobacco Use    Smoking status: Never    Smokeless tobacco: Never   Substance and Sexual Activity    Alcohol use: Yes    Drug use: Never    Sexual activity: Yes     Partners: Male     Birth control/protection: Condom     Comment: Lili power   Other Topics Concern    Not on file   Social History Narrative    Not on file     Social Determinants of Health     Financial Resource Strain: Low Risk  (7/5/2024)    Overall

## 2024-12-12 RX ORDER — SERTRALINE HYDROCHLORIDE 25 MG/1
25 TABLET, FILM COATED ORAL DAILY
Qty: 30 TABLET | Refills: 5 | Status: SHIPPED | OUTPATIENT
Start: 2024-12-12

## 2024-12-19 ENCOUNTER — OFFICE VISIT (OUTPATIENT)
Facility: CLINIC | Age: 29
End: 2024-12-19
Payer: COMMERCIAL

## 2024-12-19 VITALS
DIASTOLIC BLOOD PRESSURE: 75 MMHG | SYSTOLIC BLOOD PRESSURE: 107 MMHG | HEART RATE: 82 BPM | OXYGEN SATURATION: 98 % | BODY MASS INDEX: 29.88 KG/M2 | WEIGHT: 168.6 LBS | HEIGHT: 63 IN | RESPIRATION RATE: 13 BRPM | TEMPERATURE: 97.9 F

## 2024-12-19 DIAGNOSIS — F41.8 DEPRESSION WITH ANXIETY: Primary | ICD-10-CM

## 2024-12-19 DIAGNOSIS — B37.9 YEAST INFECTION: ICD-10-CM

## 2024-12-19 PROCEDURE — 99214 OFFICE O/P EST MOD 30 MIN: CPT | Performed by: STUDENT IN AN ORGANIZED HEALTH CARE EDUCATION/TRAINING PROGRAM

## 2024-12-19 RX ORDER — HYDROXYZINE HYDROCHLORIDE 25 MG/1
25 TABLET, FILM COATED ORAL EVERY 8 HOURS PRN
Qty: 90 TABLET | Refills: 5 | Status: SHIPPED | OUTPATIENT
Start: 2024-12-19

## 2024-12-19 RX ORDER — FLUCONAZOLE 150 MG/1
150 TABLET ORAL
Qty: 2 TABLET | Refills: 0 | Status: SHIPPED | OUTPATIENT
Start: 2024-12-19 | End: 2024-12-25

## 2024-12-19 ASSESSMENT — PATIENT HEALTH QUESTIONNAIRE - PHQ9
6. FEELING BAD ABOUT YOURSELF - OR THAT YOU ARE A FAILURE OR HAVE LET YOURSELF OR YOUR FAMILY DOWN: SEVERAL DAYS
3. TROUBLE FALLING OR STAYING ASLEEP: NEARLY EVERY DAY
SUM OF ALL RESPONSES TO PHQ QUESTIONS 1-9: 11
1. LITTLE INTEREST OR PLEASURE IN DOING THINGS: SEVERAL DAYS
SUM OF ALL RESPONSES TO PHQ QUESTIONS 1-9: 11
7. TROUBLE CONCENTRATING ON THINGS, SUCH AS READING THE NEWSPAPER OR WATCHING TELEVISION: SEVERAL DAYS
8. MOVING OR SPEAKING SO SLOWLY THAT OTHER PEOPLE COULD HAVE NOTICED. OR THE OPPOSITE, BEING SO FIGETY OR RESTLESS THAT YOU HAVE BEEN MOVING AROUND A LOT MORE THAN USUAL: NOT AT ALL
5. POOR APPETITE OR OVEREATING: MORE THAN HALF THE DAYS
2. FEELING DOWN, DEPRESSED OR HOPELESS: SEVERAL DAYS
4. FEELING TIRED OR HAVING LITTLE ENERGY: MORE THAN HALF THE DAYS
9. THOUGHTS THAT YOU WOULD BE BETTER OFF DEAD, OR OF HURTING YOURSELF: NOT AT ALL
SUM OF ALL RESPONSES TO PHQ9 QUESTIONS 1 & 2: 2
10. IF YOU CHECKED OFF ANY PROBLEMS, HOW DIFFICULT HAVE THESE PROBLEMS MADE IT FOR YOU TO DO YOUR WORK, TAKE CARE OF THINGS AT HOME, OR GET ALONG WITH OTHER PEOPLE: NOT DIFFICULT AT ALL

## 2024-12-19 ASSESSMENT — ANXIETY QUESTIONNAIRES
6. BECOMING EASILY ANNOYED OR IRRITABLE: MORE THAN HALF THE DAYS
7. FEELING AFRAID AS IF SOMETHING AWFUL MIGHT HAPPEN: NOT AT ALL
4. TROUBLE RELAXING: SEVERAL DAYS
5. BEING SO RESTLESS THAT IT IS HARD TO SIT STILL: NOT AT ALL
3. WORRYING TOO MUCH ABOUT DIFFERENT THINGS: MORE THAN HALF THE DAYS
1. FEELING NERVOUS, ANXIOUS, OR ON EDGE: MORE THAN HALF THE DAYS
GAD7 TOTAL SCORE: 8
2. NOT BEING ABLE TO STOP OR CONTROL WORRYING: SEVERAL DAYS
IF YOU CHECKED OFF ANY PROBLEMS ON THIS QUESTIONNAIRE, HOW DIFFICULT HAVE THESE PROBLEMS MADE IT FOR YOU TO DO YOUR WORK, TAKE CARE OF THINGS AT HOME, OR GET ALONG WITH OTHER PEOPLE: VERY DIFFICULT

## 2024-12-19 ASSESSMENT — ENCOUNTER SYMPTOMS
ABDOMINAL PAIN: 0
SHORTNESS OF BREATH: 0

## 2024-12-19 NOTE — PROGRESS NOTES
Renu Nolan is a 29 y.o. year old female who presents today for   Chief Complaint   Patient presents with    Other     Mood          \"Have you been to the ER, urgent care clinic since your last visit?  Hospitalized since your last visit?\"   NO     “Have you seen or consulted any other health care providers outside our system since your last visit?”   NO      “Have you had a pap smear?”    YES - Where: [facility] to request most recent records if not in the chart    No cervical cancer screening on file             - Roman Mendiola/SUZI Neely  [facility]  Phone: [phone]  Fax: [phone]  
Male     Birth control/protection: Condom     Comment: Ny tono   Other Topics Concern    Not on file   Social History Narrative    Not on file     Social Determinants of Health     Financial Resource Strain: Low Risk  (7/5/2024)    Overall Financial Resource Strain (CARDIA)     Difficulty of Paying Living Expenses: Not hard at all   Food Insecurity: No Food Insecurity (7/5/2024)    Hunger Vital Sign     Worried About Running Out of Food in the Last Year: Never true     Ran Out of Food in the Last Year: Never true   Transportation Needs: Unknown (7/5/2024)    PRAPARE - Transportation     Lack of Transportation (Medical): Not on file     Lack of Transportation (Non-Medical): No   Physical Activity: Sufficiently Active (1/3/2024)    Exercise Vital Sign     Days of Exercise per Week: 3 days     Minutes of Exercise per Session: 50 min   Stress: Not on file   Social Connections: Not on file   Intimate Partner Violence: Not on file   Housing Stability: Unknown (7/5/2024)    Housing Stability Vital Sign     Unable to Pay for Housing in the Last Year: Not on file     Number of Places Lived in the Last Year: Not on file     Unstable Housing in the Last Year: No      Current Outpatient Medications   Medication Sig Dispense Refill    hydrOXYzine HCl (ATARAX) 25 MG tablet Take 1 tablet by mouth every 8 hours as needed for Anxiety 90 tablet 5    sertraline (ZOLOFT) 25 MG tablet Take 1 tablet by mouth daily 30 tablet 5    albuterol sulfate HFA (PROVENTIL;VENTOLIN;PROAIR) 108 (90 Base) MCG/ACT inhaler Inhale 1-2 puffs into the lungs every 6 hours as needed for Wheezing 18 g 3     No current facility-administered medications for this visit.        ROS   Review of Systems   Constitutional:  Negative for chills and fever.   Respiratory:  Negative for shortness of breath.    Cardiovascular:  Negative for chest pain.   Gastrointestinal:  Negative for abdominal pain.   Neurological:  Negative for dizziness, light-headedness and